# Patient Record
Sex: FEMALE | Race: OTHER | Employment: UNEMPLOYED | URBAN - METROPOLITAN AREA
[De-identification: names, ages, dates, MRNs, and addresses within clinical notes are randomized per-mention and may not be internally consistent; named-entity substitution may affect disease eponyms.]

---

## 2024-01-16 ENCOUNTER — HOSPITAL ENCOUNTER (INPATIENT)
Facility: HOSPITAL | Age: 19
LOS: 1 days | Discharge: HOME | End: 2024-01-17
Attending: EMERGENCY MEDICINE | Admitting: OBSTETRICS & GYNECOLOGY
Payer: MEDICAID

## 2024-01-16 ENCOUNTER — ANESTHESIA (OUTPATIENT)
Dept: OBGYN UNIT | Facility: HOSPITAL | Age: 19
End: 2024-01-16
Payer: MEDICAID

## 2024-01-16 ENCOUNTER — ANESTHESIA EVENT (OUTPATIENT)
Dept: OBGYN UNIT | Facility: HOSPITAL | Age: 19
End: 2024-01-16
Payer: MEDICAID

## 2024-01-16 LAB
ALBUMIN SERPL-MCNC: 3.9 G/DL (ref 3.2–4.8)
ALBUMIN/GLOB SERPL: 1.2 {RATIO} (ref 1–2)
ALP LIVER SERPL-CCNC: 99 U/L
ALT SERPL-CCNC: 31 U/L
ANION GAP SERPL CALC-SCNC: 8 MMOL/L (ref 0–18)
AST SERPL-CCNC: 41 U/L (ref ?–34)
BASOPHILS # BLD AUTO: 0.03 X10(3) UL (ref 0–0.2)
BASOPHILS NFR BLD AUTO: 0.3 %
BILIRUB SERPL-MCNC: 0.6 MG/DL (ref 0.3–1.2)
BUN BLD-MCNC: 11 MG/DL (ref 9–23)
BUN/CREAT SERPL: 17.2 (ref 10–20)
CALCIUM BLD-MCNC: 8.7 MG/DL (ref 8.7–10.4)
CHLORIDE SERPL-SCNC: 111 MMOL/L (ref 98–112)
CO2 SERPL-SCNC: 22 MMOL/L (ref 21–32)
CREAT BLD-MCNC: 0.64 MG/DL
CREAT UR-SCNC: 15.8 MG/DL
DEPRECATED RDW RBC AUTO: 48.2 FL (ref 35.1–46.3)
EGFRCR SERPLBLD CKD-EPI 2021: 130 ML/MIN/1.73M2 (ref 60–?)
EOSINOPHIL # BLD AUTO: 0.22 X10(3) UL (ref 0–0.7)
EOSINOPHIL NFR BLD AUTO: 2 %
ERYTHROCYTE [DISTWIDTH] IN BLOOD BY AUTOMATED COUNT: 15.3 % (ref 11–15)
GLOBULIN PLAS-MCNC: 3.2 G/DL (ref 2.8–4.4)
GLUCOSE BLD-MCNC: 89 MG/DL (ref 70–99)
HCT VFR BLD AUTO: 28.3 %
HGB BLD-MCNC: 9.6 G/DL
IMM GRANULOCYTES # BLD AUTO: 0.06 X10(3) UL (ref 0–1)
IMM GRANULOCYTES NFR BLD: 0.6 %
LYMPHOCYTES # BLD AUTO: 2.38 X10(3) UL (ref 1–4)
LYMPHOCYTES NFR BLD AUTO: 21.8 %
MAGNESIUM SERPL-MCNC: 6.8 MG/DL (ref 4.8–8.4)
MCH RBC QN AUTO: 29.4 PG (ref 26–34)
MCHC RBC AUTO-ENTMCNC: 33.9 G/DL (ref 31–37)
MCV RBC AUTO: 86.8 FL
MONOCYTES # BLD AUTO: 0.59 X10(3) UL (ref 0.1–1)
MONOCYTES NFR BLD AUTO: 5.4 %
NEUTROPHILS # BLD AUTO: 7.62 X10 (3) UL (ref 1.5–7.7)
NEUTROPHILS # BLD AUTO: 7.62 X10(3) UL (ref 1.5–7.7)
NEUTROPHILS NFR BLD AUTO: 69.9 %
OSMOLALITY SERPL CALC.SUM OF ELEC: 291 MOSM/KG (ref 275–295)
PLATELET # BLD AUTO: 365 10(3)UL (ref 150–450)
POTASSIUM SERPL-SCNC: 3.3 MMOL/L (ref 3.5–5.1)
PROT SERPL-MCNC: 7.1 G/DL (ref 5.7–8.2)
PROT UR-MCNC: <6 MG/DL (ref ?–14)
RBC # BLD AUTO: 3.26 X10(6)UL
SARS-COV-2 RNA RESP QL NAA+PROBE: NOT DETECTED
SODIUM SERPL-SCNC: 141 MMOL/L (ref 136–145)
WBC # BLD AUTO: 10.9 X10(3) UL (ref 4–11)

## 2024-01-16 PROCEDURE — 3E0R3GC INTRODUCTION OF OTHER THERAPEUTIC SUBSTANCE INTO SPINAL CANAL, PERCUTANEOUS APPROACH: ICD-10-PCS | Performed by: ANESTHESIOLOGY

## 2024-01-16 PROCEDURE — 62273 INJECT EPIDURAL PATCH: CPT | Performed by: ANESTHESIOLOGY

## 2024-01-16 RX ORDER — DIPHENHYDRAMINE HYDROCHLORIDE 50 MG/ML
25 INJECTION INTRAMUSCULAR; INTRAVENOUS ONCE
Status: COMPLETED | OUTPATIENT
Start: 2024-01-16 | End: 2024-01-16

## 2024-01-16 RX ORDER — CALCIUM GLUCONATE 94 MG/ML
1 INJECTION, SOLUTION INTRAVENOUS ONCE AS NEEDED
Status: DISCONTINUED | OUTPATIENT
Start: 2024-01-16 | End: 2024-01-17

## 2024-01-16 RX ORDER — BUTALBITAL, ACETAMINOPHEN AND CAFFEINE 50; 325; 40 MG/1; MG/1; MG/1
2 TABLET ORAL EVERY 4 HOURS PRN
Status: DISCONTINUED | OUTPATIENT
Start: 2024-01-16 | End: 2024-01-17

## 2024-01-16 RX ORDER — METOCLOPRAMIDE HYDROCHLORIDE 5 MG/ML
10 INJECTION INTRAMUSCULAR; INTRAVENOUS ONCE
Status: COMPLETED | OUTPATIENT
Start: 2024-01-16 | End: 2024-01-16

## 2024-01-16 RX ORDER — SODIUM CHLORIDE, SODIUM LACTATE, POTASSIUM CHLORIDE, CALCIUM CHLORIDE 600; 310; 30; 20 MG/100ML; MG/100ML; MG/100ML; MG/100ML
INJECTION, SOLUTION INTRAVENOUS CONTINUOUS
Status: DISCONTINUED | OUTPATIENT
Start: 2024-01-16 | End: 2024-01-17

## 2024-01-16 RX ORDER — ACETAMINOPHEN 500 MG
1000 TABLET ORAL EVERY 6 HOURS PRN
Status: DISCONTINUED | OUTPATIENT
Start: 2024-01-16 | End: 2024-01-17

## 2024-01-16 RX ORDER — IBUPROFEN 600 MG/1
600 TABLET ORAL EVERY 6 HOURS PRN
Status: DISCONTINUED | OUTPATIENT
Start: 2024-01-16 | End: 2024-01-17

## 2024-01-16 RX ORDER — LIDOCAINE HYDROCHLORIDE 10 MG/ML
INJECTION, SOLUTION INFILTRATION; PERINEURAL
Status: COMPLETED | OUTPATIENT
Start: 2024-01-16 | End: 2024-01-16

## 2024-01-16 RX ORDER — LABETALOL HYDROCHLORIDE 5 MG/ML
10 INJECTION, SOLUTION INTRAVENOUS ONCE
Status: COMPLETED | OUTPATIENT
Start: 2024-01-16 | End: 2024-01-16

## 2024-01-16 RX ORDER — LABETALOL HYDROCHLORIDE 5 MG/ML
40 INJECTION, SOLUTION INTRAVENOUS ONCE AS NEEDED
Status: ACTIVE | OUTPATIENT
Start: 2024-01-16 | End: 2024-01-16

## 2024-01-16 RX ADMIN — LIDOCAINE HYDROCHLORIDE 3 ML: 10 INJECTION, SOLUTION INFILTRATION; PERINEURAL at 21:35:00

## 2024-01-16 NOTE — ED PROVIDER NOTES
Patient Seen in: [facility] Emergency Department    History     Chief Complaint   Patient presents with    Postpartum Care     Stated Complaint: Elevated BP, headache, nausea, vomiting.     HPI    18-year-old female without PMH and now PPD 5 from FT/ at 39 weeks with elevated blood pressure noted peripartum at [facility] without medical intervention during or after hospital admission and discharged two days ago presenting with  for evaluation of headaches/elevated blood pressure since discharge 2 days ago. No vision loss, no focal weakness/paresthesias. No upper abdominal pain, no CP/SOB; BLE swelling unchanged.   noting ongoing headache associated with elevated blood pressure for which evaluation sought.  No new bleeding.  No discharge or urinary complaints.    History reviewed. No pertinent past medical history.    No past surgical history on file.         History reviewed. No pertinent family history.         Review of Systems :  Constitutional: As per HPI  Eyes: Negative for discharge; (+) visual disturbance.   Respiratory: Negative for cough and shortness of breath.    Neurological: Negative for syncope and headaches.     Positive for stated complaint: Elevated BP, headache, nausea, vomiting.  Other systems are as noted in HPI.  Constitutional and vital signs reviewed.      All other systems reviewed and negative except as noted above.    PSFH elements reviewed from today and agreed except as otherwise stated in HPI.    Physical Exam     ED Triage Vitals [24 1459]   BP (!) 145/98   Pulse 102   Resp 22   Temp 98 °F (36.7 °C)   Temp src Oral   SpO2 99 %   O2 Device None (Room air)       Current:BP (!) 145/98   Pulse 102   Temp 98 °F (36.7 °C) (Oral)   Resp 22   Wt 59 kg   SpO2 99%         Physical Exam   Constitutional: No distress.   HEENT: MMM.  Head: Normocephalic.  Atraumatic.   Eyes: No injection.  No photophobia.  Neck: Neck supple.  No meningismus.  Cardiovascular: RRR.    Pulmonary/Chest: Effort normal. CTAB.  Abdominal: Soft.  Nontender.  Musculoskeletal: No gross deformity.  Lower extremities with 2+ edema without calf tenderness or palpable cord.  Neurological: Alert. CN II-XII grossly intact.  Skin: Skin is warm.   Psychiatric: Cooperative.  Nursing note and vitals reviewed.        ED Course     Labs Reviewed   CBC W/ DIFFERENTIAL - Abnormal; Notable for the following components:       Result Value    RBC 3.26 (*)     HGB 9.6 (*)     HCT 28.3 (*)     RDW-SD 48.2 (*)     RDW 15.3 (*)     All other components within normal limits   CBC WITH DIFFERENTIAL WITH PLATELET    Narrative:     The following orders were created for panel order CBC With Differential With Platelet.  Procedure                               Abnormality         Status                     ---------                               -----------         ------                     CBC W/ DIFFERENTIAL[[id number]]          Abnormal            Final result                 Please view results for these tests on the individual orders.   COMP METABOLIC PANEL (14)   URINALYSIS, ROUTINE   URINE PROTEIN/CREATININE RATIO, RANDOM   RAPID SARS-COV-2 BY PCR       ED Course as of 01/16/24 1533  ------------------------------------------------------------  Time: 01/16 1520  Comment: Case d/w OB/laborist Dr. Gray - in agreement with empiric vasoactive/parenteral blood pressure management and admission for mag sulfate therapy given clinical concern for postpartum pre-eclampsia with labs pending at time of admission.       MDM   DIFFERENTIAL DIAGNOSIS: After history and physical exam differential diagnosis includes but is not limited to pre-eclampsia, HELLP, anemia/electrolyte derangement.    Pulse ox: 99%:Normal on RA, as interpreted by myself    Medical Decision Making  Evaluation for postpartum cephalgia/elevated blood pressure in setting of BLE edema without meningismus or neuro deficits with concern for postpartum pre-eclampsia.  Multiple ED BP elevations in addition to home readings note, labetalol initiated with labs pending - case d/w OB/laborist Dr. Gray and in agreement with admission to [facility] for mag sulfate and hemodynamic monitoring with patient/family updated on plan of care.    Problems Addressed:  Preeclampsia in postpartum period: acute illness or injury    Amount and/or Complexity of Data Reviewed  Independent Historian: spouse  External Data Reviewed: notes.     Details: [facility] discharge paperwork reviewed at bedside  Labs: ordered. Decision-making details documented in ED Course.  Discussion of management or test interpretation with external provider(s): Case d/w laborist/OB Dr. Gray    Risk  Prescription drug management.  Drug therapy requiring intensive monitoring for toxicity.  Decision regarding hospitalization.    Critical Care  Total time providing critical care: 33 minutes      I was wearing at minimum a facemask and eye protection throughout this encounter with handwashing performed prior and after patient evaluation without personal hand/facial/oropharyngeal contact and gloves worn throughout encounter. See note and/or contact this provider for further PPE details.    A total of 33 minutes of critical care time (exclusive of billable procedures) was administered to manage the patient's unstable vital signs, cardiovascular instability, and neurologic instability due to her postpartum pre-eclampsia.  This involved direct patient intervention, complex decision making, and/or extensive discussions with the patient, family, and clinical staff.    We recommend that you schedule follow up care with a primary care provider within the next three months to obtain basic health screening including reassessment of your blood pressure.      You had elevated blood pressure today and you need to follow up with your doctor for a repeat blood pressure check and further discussion of lifestyle modifications that include Weight  Reduction - Dietary Sodium Restriction - Increased Physical Activity and Moderation in alcohol (ETOH) Consumption. If possible check your pressure at home and keep a blood pressure log to bring to your physician.  Disposition and Plan     Clinical Impression:  1. Preeclampsia in postpartum period      Disposition:  Admit    Follow-up:  No follow-up provider specified.    Medications Prescribed:  There are no discharge medications for this patient.

## 2024-01-16 NOTE — PROGRESS NOTES
Called to see patient for possible spinal headache.  Patient is a 17 y/o  female s/p  with an epidural on 24 at [facility].  Patient presents with preeclampsia and headache that started  morning.  She describes it as frontal and worsens in sitting position.  She denies any pain when she lies flat and pain worsens to 10/10 upon sitting.  Headache is associated with nausea and vomiting.  Denies any other symptoms. Patient states that they had a difficulty placing the epidural.  Based on her symptoms, it appears that patient has a post dural puncture headache.  Treatment options both conservative (caffeine, fluids, and fioricet) and epidural blood patch, explained to the patient and significant other. They want to try the conservative therapy and reevaluate in the morning.

## 2024-01-16 NOTE — ED INITIAL ASSESSMENT (HPI)
Pt to ED with c/o generalized headache and \"elevated blood pressure\" at home since yesterday. Pt s/p  2023 at [facility]. . No home medications for blood pressure per significant other.   No respiratory distress noted. Pt is alert and oriented x4. Pt ambulating by self with steady gait. Pt appears pale in appearance. Denies increased vaginal bleeding.

## 2024-01-17 VITALS
WEIGHT: 130 LBS | SYSTOLIC BLOOD PRESSURE: 133 MMHG | DIASTOLIC BLOOD PRESSURE: 79 MMHG | RESPIRATION RATE: 16 BRPM | BODY MASS INDEX: 24.55 KG/M2 | HEART RATE: 86 BPM | HEIGHT: 61 IN | OXYGEN SATURATION: 100 % | TEMPERATURE: 98 F

## 2024-01-17 LAB
ALBUMIN SERPL-MCNC: 4 G/DL (ref 3.2–4.8)
ALBUMIN/GLOB SERPL: 1.3 {RATIO} (ref 1–2)
ALP LIVER SERPL-CCNC: 103 U/L
ALT SERPL-CCNC: 34 U/L
ANION GAP SERPL CALC-SCNC: 7 MMOL/L (ref 0–18)
AST SERPL-CCNC: 40 U/L (ref ?–34)
BASOPHILS # BLD AUTO: 0.04 X10(3) UL (ref 0–0.2)
BASOPHILS NFR BLD AUTO: 0.4 %
BILIRUB SERPL-MCNC: 0.6 MG/DL (ref 0.3–1.2)
BUN BLD-MCNC: <5 MG/DL (ref 9–23)
CALCIUM BLD-MCNC: 7.1 MG/DL (ref 8.7–10.4)
CHLORIDE SERPL-SCNC: 109 MMOL/L (ref 98–112)
CO2 SERPL-SCNC: 26 MMOL/L (ref 21–32)
CREAT BLD-MCNC: 0.66 MG/DL
CREAT UR-SCNC: 15.5 MG/DL
DEPRECATED RDW RBC AUTO: 48.1 FL (ref 35.1–46.3)
EGFRCR SERPLBLD CKD-EPI 2021: 129 ML/MIN/1.73M2 (ref 60–?)
EOSINOPHIL # BLD AUTO: 0.22 X10(3) UL (ref 0–0.7)
EOSINOPHIL NFR BLD AUTO: 1.9 %
ERYTHROCYTE [DISTWIDTH] IN BLOOD BY AUTOMATED COUNT: 15.3 % (ref 11–15)
GLOBULIN PLAS-MCNC: 3 G/DL (ref 2.8–4.4)
GLUCOSE BLD-MCNC: 122 MG/DL (ref 70–99)
HCT VFR BLD AUTO: 30.9 %
HGB BLD-MCNC: 10.5 G/DL
IMM GRANULOCYTES # BLD AUTO: 0.06 X10(3) UL (ref 0–1)
IMM GRANULOCYTES NFR BLD: 0.5 %
LYMPHOCYTES # BLD AUTO: 2.15 X10(3) UL (ref 1–4)
LYMPHOCYTES NFR BLD AUTO: 18.9 %
MAGNESIUM SERPL-MCNC: 7.1 MG/DL (ref 4.8–8.4)
MAGNESIUM SERPL-MCNC: 7.5 MG/DL (ref 4.8–8.4)
MCH RBC QN AUTO: 29.5 PG (ref 26–34)
MCHC RBC AUTO-ENTMCNC: 34 G/DL (ref 31–37)
MCV RBC AUTO: 86.8 FL
MONOCYTES # BLD AUTO: 0.72 X10(3) UL (ref 0.1–1)
MONOCYTES NFR BLD AUTO: 6.3 %
NEUTROPHILS # BLD AUTO: 8.18 X10 (3) UL (ref 1.5–7.7)
NEUTROPHILS # BLD AUTO: 8.18 X10(3) UL (ref 1.5–7.7)
NEUTROPHILS NFR BLD AUTO: 72 %
PLATELET # BLD AUTO: 436 10(3)UL (ref 150–450)
POTASSIUM SERPL-SCNC: 3.3 MMOL/L (ref 3.5–5.1)
PROT SERPL-MCNC: 7 G/DL (ref 5.7–8.2)
PROT UR-MCNC: <6 MG/DL (ref ?–14)
RBC # BLD AUTO: 3.56 X10(6)UL
SODIUM SERPL-SCNC: 142 MMOL/L (ref 136–145)
WBC # BLD AUTO: 11.4 X10(3) UL (ref 4–11)

## 2024-01-17 PROCEDURE — 99232 SBSQ HOSP IP/OBS MODERATE 35: CPT | Performed by: OBSTETRICS & GYNECOLOGY

## 2024-01-17 RX ORDER — LABETALOL 200 MG/1
200 TABLET, FILM COATED ORAL 2 TIMES DAILY
Qty: 60 TABLET | Refills: 0 | Status: SHIPPED | OUTPATIENT
Start: 2024-01-17

## 2024-01-17 RX ORDER — LABETALOL 200 MG/1
200 TABLET, FILM COATED ORAL 2 TIMES DAILY
Status: DISCONTINUED | OUTPATIENT
Start: 2024-01-17 | End: 2024-01-17

## 2024-01-17 RX ORDER — DEXTROSE, SODIUM CHLORIDE, SODIUM LACTATE, POTASSIUM CHLORIDE, AND CALCIUM CHLORIDE 5; .6; .31; .03; .02 G/100ML; G/100ML; G/100ML; G/100ML; G/100ML
INJECTION, SOLUTION INTRAVENOUS CONTINUOUS
Status: DISCONTINUED | OUTPATIENT
Start: 2024-01-17 | End: 2024-01-17

## 2024-01-17 RX ORDER — LABETALOL 200 MG/1
TABLET, FILM COATED ORAL
Status: COMPLETED
Start: 2024-01-17 | End: 2024-01-17

## 2024-01-17 NOTE — PROGRESS NOTES
Pt feeling much better. Headache resolved. She is sitting up in bed eating and feels much better. BP elevated throughout the day and started on Labetalol 200mg BID. Reviewed for patient to take this upon discharge. She has appointment tomorrow at [facility]. Reviewed she should go to this for follow up and BP check. Reviewed appointment here as well. Preeclampsia precautions given and to check BP at home on home cuff. Pt and family express understanding and all questions answered.

## 2024-01-17 NOTE — DISCHARGE INSTRUCTIONS
Please return with severe blood pressure >160/110, headaches, severe pain, vision changes or heavy vaginal bleeding saturating more than a pad an hour.

## 2024-01-17 NOTE — PROGRESS NOTES
[facility]  OB laborist progress note    Natali Maribel Reyes Luna Patient Status:  Inpatient    2005 MRN [id number]   Location [facility] Attending Zaynab Gray MD   Hosp Day # 1 PCP No primary care provider on file.       Subjective   Patient states that she is not feeling well.  The symptoms began after her recent blood patch treatment.  Her headache, for which she was admitted and started on magnesium sulfate due to suspected severe preeclampsia, resolved with the blood patch and she currently still has no headache.  She denies shortness of breath, chest pains or difficulty breathing.  She is feeling warm, weak with some nausea.  She is also sweaty.  She denies any other complaints.    Objective   /80 (BP Location: Left arm)   Pulse 97   Temp 97.9 °F (36.6 °C) (Oral)   Resp 16   Ht 5' 1\" (1.549 m)   Wt 130 lb (59 kg)   SpO2 98%   BMI 24.56 kg/m²     Constitutional: Patient appears weak  CVS: Regular rate and rhythm  Lungs: Clear to auscultation bilaterally  Uterus: fundus firm, non-tender and fundus below umbilicus  Gastrointestinal: abdomen non-tender, fundus is firm  Extremities: No evidence of DVT seen on physical exam, trace bilateral lower extremity edema.  Neuro: DTRs 1+/4 with no clonus noted  Psychiatric: normal affect    Results:     Recent Results (from the past 24 hour(s))   Comp Metabolic Panel (14)    Collection Time: 24  3:23 PM   Result Value Ref Range    Glucose 89 70 - 99 mg/dL    Sodium 141 136 - 145 mmol/L    Potassium 3.3 (L) 3.5 - 5.1 mmol/L    Chloride 111 98 - 112 mmol/L    CO2 22.0 21.0 - 32.0 mmol/L    Anion Gap 8 0 - 18 mmol/L    BUN 11 9 - 23 mg/dL    Creatinine 0.64 0.55 - 1.02 mg/dL    BUN/CREA Ratio 17.2 10.0 - 20.0    Calcium, Total 8.7 8.7 - 10.4 mg/dL    Calculated Osmolality 291 275 - 295 mOsm/kg    eGFR-Cr 130 >=60 mL/min/1.73m2    ALT 31 10 - 49 U/L    AST 41 (H) <=34 U/L    Alkaline Phosphatase 99 52  - 144 U/L    Bilirubin, Total 0.6 0.3 - 1.2 mg/dL    Total Protein 7.1 5.7 - 8.2 g/dL    Albumin 3.9 3.2 - 4.8 g/dL    Globulin  3.2 2.8 - 4.4 g/dL    A/G Ratio 1.2 1.0 - 2.0   CBC W/ DIFFERENTIAL    Collection Time: 24  3:23 PM   Result Value Ref Range    WBC 10.9 4.0 - 11.0 x10(3) uL    RBC 3.26 (L) 3.80 - 5.30 x10(6)uL    HGB 9.6 (L) 12.0 - 16.0 g/dL    HCT 28.3 (L) 35.0 - 48.0 %    MCV 86.8 80.0 - 100.0 fL    MCH 29.4 26.0 - 34.0 pg    MCHC 33.9 31.0 - 37.0 g/dL    RDW-SD 48.2 (H) 35.1 - 46.3 fL    RDW 15.3 (H) 11.0 - 15.0 %    .0 150.0 - 450.0 10(3)uL    Neutrophil Absolute Prelim 7.62 1.50 - 7.70 x10 (3) uL    Neutrophil Absolute 7.62 1.50 - 7.70 x10(3) uL    Lymphocyte Absolute 2.38 1.00 - 4.00 x10(3) uL    Monocyte Absolute 0.59 0.10 - 1.00 x10(3) uL    Eosinophil Absolute 0.22 0.00 - 0.70 x10(3) uL    Basophil Absolute 0.03 0.00 - 0.20 x10(3) uL    Immature Granulocyte Absolute 0.06 0.00 - 1.00 x10(3) uL    Neutrophil % 69.9 %    Lymphocyte % 21.8 %    Monocyte % 5.4 %    Eosinophil % 2.0 %    Basophil % 0.3 %    Immature Granulocyte % 0.6 %   Rapid SARS-CoV-2 by PCR    Collection Time: 24  3:24 PM    Specimen: Nares; Other   Result Value Ref Range    Rapid SARS-CoV-2 by PCR Not Detected Not Detected   Protein/Creatinine Ratio, Urine Random    Collection Time: 24  4:48 PM   Result Value Ref Range    Total Protein Urine Random <6.0 <14.0 mg/dL    Creatinine Ur Random 15.80 mg/dL    Urine Protein/Creatinine Ratio, Random     Magnesium Sulfate Therapy    Collection Time: 24 10:57 PM   Result Value Ref Range    MG Sulfate Therapy 6.8 4.8 - 8.4 mg/dL   Magnesium Sulfate Therapy    Collection Time: 24  1:48 AM   Result Value Ref Range    MG Sulfate Therapy 7.5 4.8 - 8.4 mg/dL         Assessment/Plan     A: 18 y.o.  status postnormal spontaneous vaginal delivery, postpartum day #6 after induction of labor at 39 weeks due to preeclampsia per patient's history to me this  evening.  Patient has been admitted for magnesium sulfate therapy due to presumed severe preeclampsia based on elevated blood pressures and headache.  Patient's headache has resolved with a blood patch.  Patient's symptoms of being diaphoretic, nausea, lethargic and not feeling well are likely due to side effects of magnesium sulfate.    I discussed with patient and her  my recommendation to stop magnesium sulfate.  We will observe for improvement of symptoms.  I discussed use of magnesium sulfate for prevention of seizures in patients with preeclampsia and severe features.  I reviewed that outside of her headache, she did not demonstrate any evidence of severe preeclampsia.  Since her headache was likely due to a spinal headache which has resolved with treatment I do not believe she is at a significant risk for seizure.  We will check a stat CBC, CMP and urine protein creatinine ratio (which returned negative and her admission labs).  Will continue IV fluids.  Patient may have p.o. intake.  Patient and her  understand and agree with plan.    Discussed with:  nurse     patient and spouse     Plan discussed with patient who verbalizes understanding and agreement.    Hospital Course:   Date of Admission: 1/16/2024    Admission Diagnoses: Preeclampsia in postpartum period [O14.95]    Preeclampsia in postpartum period    Primary OB Clinician: [facility] clinic, we have contacted [facility] for records of her delivery admission.    LINWOOD SAEED MD, MD  1/17/2024  4:01 AM     Hemostasis: Aluminum Chloride

## 2024-01-17 NOTE — PROGRESS NOTES
Post-Partum Note   2024, 8:52 AM    Subjective:  The patient is feeling better this morning. Her headache is much improved after the blood patch yesterday. She did have a slight headache this morning and was given tylenol, which she states helped the headache. While in the room she complained of slight headache again. It is no longer with sitting up though. She states her symptoms of feeling hot and lethargic have now passed as well.     Objective:  Vitals:    24 0656 24 0701 24 0706 24 0800   BP:  125/87  130/89   BP Location:  Left arm     Pulse: 93 97 92 95   Resp:  16  16   Temp:    98.2 °F (36.8 °C)   TempSrc:    Oral   SpO2: 99% 100% 99% 98%   Weight:       Height:         Vitals:    24 0800   BP: 130/89   Pulse: 95   Resp: 16   Temp: 98.2 °F (36.8 °C)     Date 24 0700 - 24 0659   Shift 5472-1796 [id number] 0537-9493 24 Hour Total   INTAKE   Shift Total(mL/kg)       OUTPUT   Urine(mL/kg/hr) 100   100   Shift Total(mL/kg) 100(1.7)   100(1.7)   Weight (kg) 59 59 59 59         PE:  General: A&O  Abdomen: Soft, non-tender, no guarding or rebound  BLE: 1+ edema, non-tender, brisk reflexes   Extremities:  no calf tenderness    Data:   Recent Labs     24  1523 24  0359   WBC 10.9 11.4*   HGB 9.6* 10.5*   HCT 28.3* 30.9*       Assessment/Plan:  18 year old  , s/p spontaneous vaginal PP preeclampsia. Spinal headache    Headache  - improved after blood patch  -continue tylenol and ibuprofen as needed  Preeclampsia  -questionable severe features as headache likely a spinal headache from epidural  -without headache no severe features and BP not in severe range. Due to this and patient becoming symptomatic on magnesium it was stopped prior to 24 hrs.   -Will monitor HA and BP today. Will consider antihypertensives if consistently >140/90        Zaynab Gray MD 2024 8:52 AM

## 2024-01-17 NOTE — PROGRESS NOTES
Pt. Given discharge instructions in [place].  Both pt. And her  verbalized understanding.  Pt. To follow up with own provider on 1/18/24 as previously scheduled.  Then follow up with Dr. Redding in one week.  Pt. Understands they are to call and schedule appointment.       Discharged home  Ambulatory and in stable condition with written and verbal preeclampsia instructions. Patient verbalizes understanding of information given.     Pt. Taken down to East entrance via wheelchair.  Pt. Was accompanied by family member.

## 2024-01-17 NOTE — ANESTHESIA PROCEDURE NOTES
Blood Patch    Date/Time: 1/16/2024 9:35 PM    Performed by: Sixto Cunningham MD  Authorized by: Sixto Cunningham MD    General Information and Staff    Start Time:  1/16/2024 9:35 PM  End Time:  1/16/2024 9:40 PM  Anesthesiologist:  Sixto Cunningham MD  Performed by:  Anesthesiologist  Patient Location:  Floor  Site Identification: surface landmarks    Reason for Blood Patch: spinal headache    Preanesthetic Checklist: 2 patient identifiers, IV checked, site marked, risks and benefits discussed, surgical consent, monitors and equipment checked, pre-op evaluation, timeout performed, anesthesia consent and sterile technique used      Procedure Details    Location of Venous Blood Draw:  Arm  Volume of Blood Injected:  20 mL  Patient Position:  Sitting  Prep: Chloraprep and patient draped    Monitoring:  Blood pressure monitoring and continuous pulse oximetry  Approach:  Midline  Location:  L3-4  Injection Technique:  NATHAN air    Needle and Epidural Catheter Details    Injection Method:  Touhy needle  Needle Gauge:  18  Needle Length (cm):  9  Loss of Resistance:  4    Assessment    Events: well tolerated      Medications   1/16/2024 9:35 PM  lidocaine injection 1% - Intradermal   3 mL - 1/16/2024 9:35:00 PM    Additional Comments

## 2024-01-18 ENCOUNTER — PATIENT OUTREACH (OUTPATIENT)
Dept: CASE MANAGEMENT | Age: 19
End: 2024-01-18

## 2024-01-19 NOTE — PAYOR COMM NOTE
--------------  ADMISSION REVIEW     Payor: [employer]  Subscriber #:  [id number]  Authorization Number: [id number]    Admit date: 24  Admit time:  3:43 PM       REVIEW DOCUMENTATION:    Patient Seen in: [facility] Emergency Department    History     Chief Complaint   Patient presents with    Postpartum Care     Stated Complaint: Elevated BP, headache, nausea, vomiting.     HPI    18-year-old female without PMH and now PPD 5 from FT/ at 39 weeks with elevated blood pressure noted peripartum at [facility] without medical intervention during or after hospital admission and discharged two days ago presenting with  for evaluation of headaches/elevated blood pressure since discharge 2 days ago. No vision loss, no focal weakness/paresthesias. No upper abdominal pain, no CP/SOB; BLE swelling unchanged.   noting ongoing headache associated with elevated blood pressure for which evaluation sought.  No new bleeding.  No discharge or urinary complaints.        Review of Systems :  Constitutional: As per HPI  Eyes: Negative for discharge; (+) visual disturbance.   Respiratory: Negative for cough and shortness of breath.    Neurological: Negative for syncope and headaches.     Positive for stated complaint: Elevated BP, headache, nausea, vomiting.  Other systems are as noted in HPI.  Constitutional and vital signs reviewed.      All other systems reviewed and negative except as noted above.    PSFH elements reviewed from today and agreed except as otherwise stated in HPI.    Physical Exam     ED Triage Vitals [24 1459]   BP (!) 145/98   Pulse 102   Resp 22   Temp 98 °F (36.7 °C)   Temp src Oral   SpO2 99 %   O2 Device None (Room air)       ED Course     Labs Reviewed   CBC W/ DIFFERENTIAL - Abnormal; Notable for the following components:       Result Value    RBC 3.26 (*)     HGB 9.6 (*)     HCT 28.3 (*)     RDW-SD 48.2 (*)     RDW 15.3 (*)     All other components within normal limits   ED Course  as of 24 1533  ------------------------------------------------------------  Time:  1520  Comment: Case d/w OB/laborist Dr. Gray - in agreement with empiric vasoactive/parenteral blood pressure management and admission for mag sulfate therapy given clinical concern for postpartum pre-eclampsia with labs pending at time of admission.     Medical Decision Making  Evaluation for postpartum cephalgia/elevated blood pressure in setting of BLE edema without meningismus or neuro deficits with concern for postpartum pre-eclampsia. Multiple ED BP elevations in addition to home readings note, labetalol initiated with labs pending - case d/w OB/laborist Dr. Gray and in agreement with admission to [facility] for mag sulfate and hemodynamic monitoring with patient/family updated on plan of care.      You had elevated blood pressure today and you need to follow up with your doctor for a repeat blood pressure check and further discussion of lifestyle modifications that include Weight Reduction - Dietary Sodium Restriction - Increased Physical Activity and Moderation in alcohol (ETOH) Consumption. If possible check your pressure at home and keep a blood pressure log to bring to your physician.  Disposition and Plan     Clinical Impression:  1. Preeclampsia in postpartum period        24 Anesthesia     Called to see patient for possible spinal headache.  Patient is a 17 y/o  female s/p  with an epidural on 24 at [facility].  Patient presents with preeclampsia and headache that started  morning.  She describes it as frontal and worsens in sitting position.  She denies any pain when she lies flat and pain worsens to 10/10 upon sitting.  Headache is associated with nausea and vomiting.  Denies any other symptoms. Patient states that they had a difficulty placing the epidural.  Based on her symptoms, it appears that patient has a post dural puncture headache.  Treatment options both conservative  (caffeine, fluids, and fioricet) and epidural blood patch, explained to the patient and significant other. They want to try the conservative therapy and reevaluate in the morning.     Blood Patch     Date/Time: 2024 9:35 PM     Performed by: Sixto Cunningham MD  Authorized by: Sixto Cunningham MD    General Information and Staff     Start Time:  2024 9:35 PM  End Time:  2024 9:40 PM  Anesthesiologist:  Sixto Cunningham MD  Performed by:  Anesthesiologist  Patient Location:  Floor  Site Identification: surface landmarks    Reason for Blood Patch: spinal headache    Preanesthetic Checklist: 2 patient identifiers, IV checked, site marked, risks and benefits discussed, surgical consent, monitors and equipment checked, pre-op evaluation, timeout performed, anesthesia consent and sterile technique used         24 Ob Gyne      Patient states that she is not feeling well.  The symptoms began after her recent blood patch treatment.  Her headache, for which she was admitted and started on magnesium sulfate due to suspected severe preeclampsia, resolved with the blood patch and she currently still has no headache.  She denies shortness of breath, chest pains or difficulty breathing.  She is feeling warm, weak with some nausea.  She is also sweaty.  She denies any other complaints.      A: 18 y.o.  status postnormal spontaneous vaginal delivery, postpartum day #6 after induction of labor at 39 weeks due to preeclampsia per patient's history to me this evening.  Patient has been admitted for magnesium sulfate therapy due to presumed severe preeclampsia based on elevated blood pressures and headache.  Patient's headache has resolved with a blood patch.  Patient's symptoms of being diaphoretic, nausea, lethargic and not feeling well are likely due to side effects of magnesium sulfate.     I discussed with patient and her  my recommendation to stop magnesium sulfate.  We will observe  for improvement of symptoms.  I discussed use of magnesium sulfate for prevention of seizures in patients with preeclampsia and severe features.  I reviewed that outside of her headache, she did not demonstrate any evidence of severe preeclampsia.  Since her headache was likely due to a spinal headache which has resolved with treatment I do not believe she is at a significant risk for seizure.  We will check a stat CBC, CMP and urine protein creatinine ratio (which returned negative and her admission labs).  Will continue IV fluids.  Patient may have p.o. intake.  Patient and her  understand and agree with plan.            24 Ob gyne      Data:        Recent Labs     24  1523 24  0359   WBC 10.9 11.4*   HGB 9.6* 10.5*   HCT 28.3* 30.9*         Assessment/Plan:  18 year old  , s/p spontaneous vaginal PP preeclampsia. Spinal headache     Headache  - improved after blood patch  -continue tylenol and ibuprofen as needed  Preeclampsia  -questionable severe features as headache likely a spinal headache from epidural  -without headache no severe features and BP not in severe range. Due to this and patient becoming symptomatic on magnesium it was stopped prior to 24 hrs.   -Will monitor HA and BP today. Will consider antihypertensives if consistently >140/90        Discharged: 2024 6633             MEDICATIONS ADMINISTERED IN LAST 1 DAY:            Vitals (last day) before discharge       Date/Time Temp Pulse Resp BP SpO2 Weight O2 Device O2 Flow Rate (L/min) [facility]    24 1600 -- 86 -- 133/79 100 % -- -- --     24 1500 -- 89 -- 132/84 99 % -- -- --     24 1400 -- 91 -- 137/95 100 % -- -- --     24 1330 -- 97 -- -- 100 % -- -- --     24 1300 98.4 °F (36.9 °C) 99 16 126/84 99 % -- -- --     24 1200 -- 98 -- 133/91 99 % -- -- --     24 0656 -- 93 -- -- 99 % -- -- --     24 0651 -- 95 -- -- 99 % -- -- --     24 0646 -- 96 --  -- 99 % -- -- -- YING    01/17/24 0601 98.1 °F (36.7 °C) 96 16 130/85 -- -- None (Room air) -- JC 01/17/24 0504 -- 101 -- 136/92 100 % -- -- -- JC 01/17/24 0501 -- 102 16 134/91 100 % -- None (Room air) -- JC 01/17/24 0403 -- 98 16 136/87 -- -- -- -- JC 01/17/24 0401 -- 102 -- -- 98 % -- -- -- JC 01/17/24 0400 -- -- -- -- -- -- None (Room air) -- JC 01/17/24 0351 -- 95 -- 136/87 99 % -- -- -- JC 01/17/24 0301 97.9 °F (36.6 °C) 97 16 134/80 98 % -- None (Room air) -- JC 01/17/24 0300 -- 90 -- -- -- -- -- -- JC 01/17/24 0256 -- 102 -- -- 98 % -- -- -- JC 01/17/24 0201 98.3 °F (36.8 °C) -- -- -- -- -- -- -- JC 01/17/24 0101 98.4 °F (36.9 °C) 99 16 129/84 99 % -- -- -- JC 01/17/24 0100 -- 97 -- -- -- -- -- -- JC 01/17/24 0051 -- 99 -- -- 100 % -- -- -- JC 01/17/24 0046 -- 99 -- -- 100 % -- -- -- JC 01/17/24 0036 -- 97 -- -- 100 % -- -- -- JC 01/17/24 0031 -- 99 -- -- 99 % -- -- -- JC 01/17/24 0021 -- 99 -- -- 99 % -- -- -- JC 01/17/24 0011 -- 98 -- -- 98 % -- -- -- YING    01/17/24 0010 -- 98 -- 127/85 -- -- -- -- JC 01/17/24 0006 -- 99 -- -- 98 % -- -- -- JC 01/17/24 0001 98.1 °F (36.7 °C) -- 16 -- -- -- None (Room air) -- JC 01/17/24 0000 -- 92 -- 127/85 96 % -- -- -- JC 01/16/24 2351 -- 93 -- -- 96 % -- -- -- JC 01/16/24 2346 -- 91 -- -- 96 % -- -- -- JC 01/16/24 2341 -- 93 -- -- 95 % -- -- -- JC 01/16/24 2336 -- 91 -- -- 96 % -- -- -- JC 01/16/24 2331 -- 93 -- -- 96 % -- -- -- JC 01/16/24 2326 -- 92 -- -- 97 % -- -- -- JC 01/16/24 2321 -- 98 -- -- 98 % -- -- -- JC 01/16/24 2316 -- 92 -- -- 96 % -- -- -- JC 01/16/24 2311 -- 94 -- -- 98 % -- -- -- JC 01/16/24 2306 -- 95 -- -- 98 % -- -- -- JC 01/16/24 2301 98.3 °F (36.8 °C) 97 16 125/81 98 % -- None (Room air) -- JC 01/16/24 2300 -- 92 -- -- -- -- -- -- JC 01/16/24 2256 -- 94 -- -- 98 % -- -- -- JC 01/16/24 2220 -- 99 -- -- 98 % -- -- -- JC 01/16/24 2216 -- 99 -- --  98 % -- -- -- YING    01/16/24 2215 -- 98 -- 133/84 -- -- -- -- YING    01/16/24 2201 -- 101 -- 133/83 -- -- -- -- JC 01/16/24 2200 -- 102 -- 138/89 -- -- -- -- YING    01/16/24 2158 -- 97 -- 136/87 -- -- -- -- JC 01/16/24 2152 -- 103 -- 138/90 -- -- -- -- JC 01/16/24 2150 -- 106 -- 134/92 -- -- -- -- JC 01/16/24 2145 -- 96 -- 139/87 -- -- -- -- JC 01/16/24 2130 -- 106 -- 141/97 98 % -- -- -- JC 01/16/24 2115 -- 103 -- -- 98 % -- -- -- JC 01/16/24 2100 -- 108 -- 131/89 99 % -- -- -- YING    01/16/24 2051 -- 101 -- -- 99 % -- -- -- YING    01/16/24 2045 -- 103 -- -- 100 % -- -- -- JC 01/16/24 2041 -- 104 -- -- 99 % -- -- -- JC 01/16/24 2036 -- 106 -- -- 99 % -- -- -- JC 01/16/24 2026 -- 96 -- -- 97 % -- -- -- JC 01/16/24 2000 -- 101 -- 126/88 99 % -- None (Room air) -- YING    01/16/24 1945 -- 102 -- 132/84 99 % -- -- -- JC 01/16/24 1930 -- 104 -- 130/83 98 % -- -- -- YING    01/16/24 1915 98.3 °F (36.8 °C) 104 16 132/83 99 % -- -- -- YING    01/16/24 1900 -- 108 -- 127/80 98 % -- -- -- MB    01/16/24 1845 -- 104 -- 131/83 98 % -- -- -- YING    01/16/24 1830 -- 99 -- 128/89 -- -- -- -- YING    01/16/24 1800 -- 102 -- 126/81 97 % -- -- -- MB    01/16/24 1700 -- 101 -- 128/84 97 % -- -- -- MB    01/16/24 1630 -- 106 -- 123/84 98 % -- -- -- MB    01/16/24 1615 98.2 °F (36.8 °C) 95 -- 131/89 99 % -- -- -- MB    01/16/24 1600 -- 100 -- 138/90 99 % -- -- -- MB    01/16/24 1545 -- 100 -- 129/78 99 % 130 lb -- -- MB    01/16/24 1530 -- -- -- -- -- -- None (Room air) -- SK    01/16/24 1519 -- 77 17 136/97 98 % -- -- -- SK    01/16/24 1509 -- 95 16 153/105 98 % -- -- -- SK    01/16/24 1459 98 °F (36.7 °C) 102 22 145/98 99 % 130 lb None (Room air) -- VM

## (undated) NOTE — LETTER
[place] [address] , [place], [place]  Autorización para operación y procedimiento quirúrgico   Fecha:_1/16/2024__________                                                                                                         Hora:__________   Por la presente, autorizo a Blood Patch`, mi médico y al asistente a realizar la operación/procedimiento quirúrgico a continuación, así grady a administrar la anestesia que determine necesaria mi médico                  Nombre (s) de la operación/procedimiento: BLOOD PATCH a Danna Gayle Reyes Luna           Reconozco que micheal la operación/procedimiento quirúrgico, las condiciones imprevistas pueden requerir de procedimientos adicionales o diferentes a aquellos mencionados anteriormente.  Por lo tanto, autorizo y solicito además que el cirujano antes mencionado, los asistentes o las personas designadas realicen los procedimientos que, a carr juicio, kaya necesarios y convenientes.    Mi cirujano/médico martin discutido antes de mi cirugía los posibles beneficios, riesgos y efectos secundarios de keshia procedimiento, la probabilidad de alcanzar las metas y los posibles problemas que puedan ocurrir micheal la recuperación.  Ellos también emanuel discutido las alternativas razonables al procedimiento, incluso los riesgos, beneficios y efectos secundarios relacionados con las alternativas y los riesgos relacionados con no realizar keshia procedimiento.  Emanuel respondido a todas mis preguntas y confirmo que no se ha dado ninguna garantía en cuanto a los resultados que pueda obtener.    En emghan de que surja la necesidad micheal mi operación o micheal el periodo postoperatorio, también autorizo se aplique cody y/o productos sanguíneos.  Asimismo, entiendo que a pesar de las cuidadosas pruebas y análisis de cody o de los productos sanguíneos que realizan las entidades recolectoras, todavía puedo estar sujeto a efectos adversos grady resultado de recibir ulysses  transfusión de cody y/o productos sanguíneos.  A continuación se mencionan algunos, aunque no todos, los riesgos potenciales que pueden ocurrir: fiebre y reacciones alérgicas, reacciones hemolíticas, trasmisión de enfermedades grady hepatitis, SIDA y citomegalovirus (CMV), así grady sobrecarga de líquidos.   En meghan de que desee tener ulysses transfusión autóloga de mi propia cody o ulysses transfusión de un donante dirigido.  Lo discutiré con mi médico.   Autorizo el uso de cualquier muestra, órgano, tejido, parte del cuerpo u objeto extraño que pueda ser extraído de mi cuerpo micheal la operación/procedimiento para fines de diagnóstico, investigación o de enseñanza y carr desecho posterior por las autoridades del hospital.  También, autorizo la revelación de los resultados de las pruebas de muestras y/o los informes escritos al médico tratante grady personal médico del hospital u otros médicos de referencia o consulta involucrados en mi atención, a discreción del patólogo o de mi médico tratante.    Doy consentimiento para que se fotografíen o graben vídeos de las operaciones o procedimientos a realizarse, incluidas las partes de mi cuerpo que kaya adecuadas para propósitos médicos, científicos o educativos, en el entendido de que, mi identidad no sea revelada por las fotografías o por textos descriptivos que las acompañen.  Si el procedimiento es fotografiado o grabado en vídeo, el cirujano obtendrá la imagen, cinta de vídeo o CD original.  El hospital no se hará responsable por el almacenamiento, la revelación o el mantenimiento de la imagen, cinta o CD.    Autorizo la presencia de un especialista de producto u observadores en el quirófano, según lo considere necesario mi médico o las personas que éste designe.     Reconozco que en meghan de que mi procedimiento resulte en un tiempo prolongado de radiografía/fluoroscopia, puedo desarrollar ulysses reacción en la piel.    Si tengo ulysses orden de No intentar la reanimación (BETTY),  satinder estado se suspenderá mientras esté en el quirófano, la kem de procedimientos y micheal el periodo de recuperación a menos que yo indique lo contrario explícitamente (o ulysses persona autorizada a gabriela el consentimiento en mi nombre). El cirujano o mi médico tratante determinarán cuándo termina el periodo de recuperación aplicable a los efectos de restablecer la orden de BETTY.  Pacientes que se realizan un procedimiento de esterilización: Entiendo que si el procedimiento tiene éxito, los resultados serán permanentes y que, por lo tanto, me será imposible inseminar, concebir o tener hijos.  Asimismo, entiendo que el procedimiento tiene grady propósito la esterilidad, aunque el resultado no está garantizado.   Admito que mi médico me ha explicado la aplicación de sedación/analgesia, incluidos los riesgos y beneficios y, consiento a la administración de sedación/analgesia conforme sea necesario o conveniente a juicio de mi médico.    CERTIFICO QUE HE LEÍDO Y COMPRENDIDO EL CONSENTIMIENTO ANTERIOR PARA LA OPERACIÓN y/o PROCEDIMIENTO.    ________________________________________  __________________________________  Firma del paciente      Firma de la persona responsable                        ___________________________________                                   Nombre en imprenta de la persona responsable          ___________________________________                 Relación con el paciente    _________________________________________  ______________ ________________  Firma del testigo          Fecha   Hora    DECLARACÓN DEL MÉDICO Mediante mi firma al calce, afirmo que antes de la hora del procedimiento, he explicadoal paciente y/o a carr representante legal, los riesgos y beneficios involucrados en el tratamiento propuesto, así comocualquier alternativa razonable al tratamiento propuesto. También charly(s) he explicado los riesgos y beneficios involucradosen el rechazo del tratarniento propuesto y alternativas al tratamiento  heron piña he respondido a las preguntas del(la) paciente(My signature below affirms that prior to the time of the procedure, I have explained to the patient and/or his/her guardian, therisks and benefits involved in the proposed treatment and any reasonable alternative to the proposed treatment. I have alsoexplained the risks and benefits involved in refusal of the proposed treatment and have answered the patient's questions.)    ________________________________________   _________________________   _____________   (Firma del médico/Signature of Physician)                                    (Fecha/Date)                                             (Hora/Time)